# Patient Record
Sex: FEMALE | Race: WHITE | NOT HISPANIC OR LATINO | Employment: OTHER | URBAN - METROPOLITAN AREA
[De-identification: names, ages, dates, MRNs, and addresses within clinical notes are randomized per-mention and may not be internally consistent; named-entity substitution may affect disease eponyms.]

---

## 2021-06-09 ENCOUNTER — OFFICE VISIT (OUTPATIENT)
Dept: URGENT CARE | Facility: CLINIC | Age: 41
End: 2021-06-09
Payer: COMMERCIAL

## 2021-06-09 VITALS
SYSTOLIC BLOOD PRESSURE: 88 MMHG | HEIGHT: 65 IN | BODY MASS INDEX: 18.49 KG/M2 | HEART RATE: 58 BPM | RESPIRATION RATE: 16 BRPM | TEMPERATURE: 97.7 F | OXYGEN SATURATION: 100 % | DIASTOLIC BLOOD PRESSURE: 60 MMHG | WEIGHT: 111 LBS

## 2021-06-09 DIAGNOSIS — R30.0 DYSURIA: ICD-10-CM

## 2021-06-09 DIAGNOSIS — N30.01 ACUTE CYSTITIS WITH HEMATURIA: Primary | ICD-10-CM

## 2021-06-09 LAB
SL AMB  POCT GLUCOSE, UA: NORMAL
SL AMB LEUKOCYTE ESTERASE,UA: NORMAL
SL AMB POCT BILIRUBIN,UA: NORMAL
SL AMB POCT BLOOD,UA: NORMAL
SL AMB POCT CLARITY,UA: CLEAR
SL AMB POCT COLOR,UA: YELLOW
SL AMB POCT KETONES,UA: NORMAL
SL AMB POCT NITRITE,UA: NORMAL
SL AMB POCT PH,UA: 7.5
SL AMB POCT SPECIFIC GRAVITY,UA: 1
SL AMB POCT URINE HCG: NEGATIVE
SL AMB POCT URINE PROTEIN: NORMAL
SL AMB POCT UROBILINOGEN: 0.2

## 2021-06-09 PROCEDURE — 81025 URINE PREGNANCY TEST: CPT | Performed by: NURSE PRACTITIONER

## 2021-06-09 PROCEDURE — 81002 URINALYSIS NONAUTO W/O SCOPE: CPT | Performed by: NURSE PRACTITIONER

## 2021-06-09 PROCEDURE — 87086 URINE CULTURE/COLONY COUNT: CPT | Performed by: NURSE PRACTITIONER

## 2021-06-09 PROCEDURE — G0382 LEV 3 HOSP TYPE B ED VISIT: HCPCS | Performed by: NURSE PRACTITIONER

## 2021-06-09 RX ORDER — NITROFURANTOIN 25; 75 MG/1; MG/1
100 CAPSULE ORAL 2 TIMES DAILY
Qty: 10 CAPSULE | Refills: 0 | Status: SHIPPED | OUTPATIENT
Start: 2021-06-09 | End: 2021-06-14

## 2021-06-09 NOTE — PROGRESS NOTES
[facility]        NAME: Faisal Mcmanus is a 37 y o  female  : 1980    MRN: [id number]  DATE: 2021  TIME: 2:35 PM    Assessment and Plan   Acute cystitis with hematuria [N30 01]  1  Acute cystitis with hematuria  nitrofurantoin (MACROBID) 100 mg capsule    Urine culture   2  Dysuria  POCT urine dip    POCT urine HCG         Patient Instructions     Patient Instructions     Take antibiotic as directed  Recommend drinking plenty of fluids including water and cranberry juice  Recommend avoiding caffeine or alcohol  Empty bladder frequently  If you develop any high fever, severe back pain, abdominal pain, nausea, vomiting or any concerning symptoms please return proceed ER  Recommend following up with PCP in 3-5 days    Urinary Tract Infection in Women   WHAT YOU NEED TO KNOW:   A urinary tract infection (UTI) is caused by bacteria that get inside your urinary tract  Most bacteria that enter your urinary tract come out when you urinate  If the bacteria stay in your urinary tract, you may get an infection  Your urinary tract includes your kidneys, ureters, bladder, and urethra  Urine is made in your kidneys, and it flows from the ureters to the bladder  Urine leaves the bladder through the urethra  A UTI is more common in your lower urinary tract, which includes your bladder and urethra  DISCHARGE INSTRUCTIONS:   Return to the emergency department if:   · You are urinating very little or not at all  · You have a high fever with shaking chills  · You have side or back pain that gets worse  Call your doctor if:   · You have a fever  · You do not feel better after 2 days of taking antibiotics  · You are vomiting  · You have questions or concerns about your condition or care  Medicines:   · Antibiotics  help fight a bacterial infection  If you have UTIs often (called recurrent UTIs), you may be given antibiotics to take regularly   You will be given directions for when and how to use antibiotics  The goal is to prevent UTIs but not cause antibiotic resistance by using antibiotics too often  · Medicines  may be given to decrease pain and burning when you urinate  They will also help decrease the feeling that you need to urinate often  These medicines will make your urine orange or red  · Take your medicine as directed  Contact your healthcare provider if you think your medicine is not helping or if you have side effects  Tell him or her if you are allergic to any medicine  Keep a list of the medicines, vitamins, and herbs you take  Include the amounts, and when and why you take them  Bring the list or the pill bottles to follow-up visits  Carry your medicine list with you in case of an emergency  Follow up with your healthcare provider as directed:  Write down your questions so you remember to ask them during your visits  Prevent another UTI:   · Empty your bladder often  Urinate and empty your bladder as soon as you feel the need  Do not hold your urine for long periods of time  · Wipe from front to back after you urinate or have a bowel movement  This will help prevent germs from getting into your urinary tract through your urethra  · Drink liquids as directed  Ask how much liquid to drink each day and which liquids are best for you  You may need to drink more liquids than usual to help flush out the bacteria  Do not drink alcohol, caffeine, or citrus juices  These can irritate your bladder and increase your symptoms  Your healthcare provider may recommend cranberry juice to help prevent a UTI  · Urinate after you have sex  This can help flush out bacteria passed during sex  · Do not douche or use feminine deodorants  These can change the chemical balance in your vagina  · Change sanitary pads or tampons often  This will help prevent germs from getting into your urinary tract  · Talk to your healthcare provider about your birth control method  You may need to change your method if it is increasing your risk for UTIs  · Wear cotton underwear and clothes that are loose  Tight pants and nylon underwear can trap moisture and cause bacteria to grow  · Vaginal estrogen may be recommended  This medicine helps prevent UTIs in women who have gone through menopause or are in kaylee-menopause  · Do pelvic muscle exercises often  Pelvic muscle exercises may help you start and stop urinating  Strong pelvic muscles may help you empty your bladder easier  Squeeze these muscles tightly for 5 seconds like you are trying to hold back urine  Then relax for 5 seconds  Gradually work up to squeezing for 10 seconds  Do 3 sets of 15 repetitions a day, or as directed  © Copyright [address] Information is for End User's use only and may not be sold, redistributed or otherwise used for commercial purposes  All illustrations and images included in CareNotes® are the copyrighted property of [employer]  or [employer]   The above information is an  only  It is not intended as medical advice for individual conditions or treatments  Talk to your doctor, nurse or pharmacist before following any medical regimen to see if it is safe and effective for you  Follow up with PCP in 3-5 days  Proceed to  ER if symptoms worsen  Chief Complaint     Chief Complaint   Patient presents with    Possible UTI     pt states 2 weeks ago she had urinary burning then it went away  A few days ago she noticted cloudy urine, lower back pain, and vaginal itching and irritation  Thought she had yeast infection and applied OTC yeast infection cream and states made it burn  History of Present Illness       Patient is a 43-year-old female who presents with a 2 week history of dysuria and frequency  Patient states that a few days ago she notes her urine was cloudy  Has had intermittent vaginal itching and irritation    Denies any vaginal discharge  Last menstrual period was 1 week ago  Denies any recent UTIs  Denies any flank pain or hematuria  Denies any fever, chills, body aches  Denies any abdominal pain, nausea, or vomiting  Review of Systems   Review of Systems   Constitutional: Negative for chills, diaphoresis, fatigue and fever  Respiratory: Negative for cough, chest tightness and shortness of breath  Cardiovascular: Negative for chest pain  Gastrointestinal: Negative for abdominal pain, diarrhea, nausea and vomiting  Genitourinary: Positive for dysuria, frequency and urgency  Negative for decreased urine volume, difficulty urinating, flank pain, hematuria, pelvic pain, vaginal bleeding, vaginal discharge and vaginal pain  Musculoskeletal: Negative for back pain, joint swelling, myalgias, neck pain and neck stiffness  Skin: Negative for rash  Neurological: Negative for dizziness, weakness, numbness and headaches  Current Medications       Current Outpatient Medications:     nitrofurantoin (MACROBID) 100 mg capsule, Take 1 capsule (100 mg total) by mouth 2 (two) times a day for 5 days, Disp: 10 capsule, Rfl: 0    Current Allergies     Allergies as of 06/09/2021    (No Known Allergies)            The following portions of the patient's history were reviewed and updated as appropriate: allergies, current medications, past family history, past medical history, past social history, past surgical history and problem list      History reviewed  No pertinent past medical history  History reviewed  No pertinent surgical history  History reviewed  No pertinent family history  Medications have been verified  Objective   BP (!) 88/60 Comment: pt states she normally has low bp  Pulse 58   Temp 97 7 °F (36 5 °C) (Temporal)   Resp 16   Ht 5' 5" (1 651 m)   Wt 50 3 kg (111 lb)   LMP  (Within Days)   SpO2 100%   BMI 18 47 kg/m²   No LMP recorded (within days)         Physical Exam     Physical Exam  Constitutional:       General: She is not in acute distress  Appearance: Normal appearance  She is well-developed  She is not diaphoretic  Cardiovascular:      Rate and Rhythm: Normal rate and regular rhythm  Heart sounds: Normal heart sounds, S1 normal and S2 normal    Pulmonary:      Effort: Pulmonary effort is normal       Breath sounds: Normal breath sounds and air entry  Abdominal:      General: Bowel sounds are normal  There is no distension  Palpations: Abdomen is soft  Abdomen is not rigid  There is no mass  Tenderness: There is no abdominal tenderness  There is no right CVA tenderness, left CVA tenderness, guarding or rebound  Negative signs include Moses's sign and McBurney's sign  Skin:     General: Skin is warm and dry  Neurological:      Mental Status: She is alert and oriented to person, place, and time

## 2021-06-09 NOTE — PATIENT INSTRUCTIONS
Take antibiotic as directed  Recommend drinking plenty of fluids including water and cranberry juice  Recommend avoiding caffeine or alcohol  Empty bladder frequently  If you develop any high fever, severe back pain, abdominal pain, nausea, vomiting or any concerning symptoms please return proceed ER  Recommend following up with PCP in 3-5 days    Urinary Tract Infection in Women   WHAT YOU NEED TO KNOW:   A urinary tract infection (UTI) is caused by bacteria that get inside your urinary tract  Most bacteria that enter your urinary tract come out when you urinate  If the bacteria stay in your urinary tract, you may get an infection  Your urinary tract includes your kidneys, ureters, bladder, and urethra  Urine is made in your kidneys, and it flows from the ureters to the bladder  Urine leaves the bladder through the urethra  A UTI is more common in your lower urinary tract, which includes your bladder and urethra  DISCHARGE INSTRUCTIONS:   Return to the emergency department if:   · You are urinating very little or not at all  · You have a high fever with shaking chills  · You have side or back pain that gets worse  Call your doctor if:   · You have a fever  · You do not feel better after 2 days of taking antibiotics  · You are vomiting  · You have questions or concerns about your condition or care  Medicines:   · Antibiotics  help fight a bacterial infection  If you have UTIs often (called recurrent UTIs), you may be given antibiotics to take regularly  You will be given directions for when and how to use antibiotics  The goal is to prevent UTIs but not cause antibiotic resistance by using antibiotics too often  · Medicines  may be given to decrease pain and burning when you urinate  They will also help decrease the feeling that you need to urinate often  These medicines will make your urine orange or red  · Take your medicine as directed    Contact your healthcare provider if you think your medicine is not helping or if you have side effects  Tell him or her if you are allergic to any medicine  Keep a list of the medicines, vitamins, and herbs you take  Include the amounts, and when and why you take them  Bring the list or the pill bottles to follow-up visits  Carry your medicine list with you in case of an emergency  Follow up with your healthcare provider as directed:  Write down your questions so you remember to ask them during your visits  Prevent another UTI:   · Empty your bladder often  Urinate and empty your bladder as soon as you feel the need  Do not hold your urine for long periods of time  · Wipe from front to back after you urinate or have a bowel movement  This will help prevent germs from getting into your urinary tract through your urethra  · Drink liquids as directed  Ask how much liquid to drink each day and which liquids are best for you  You may need to drink more liquids than usual to help flush out the bacteria  Do not drink alcohol, caffeine, or citrus juices  These can irritate your bladder and increase your symptoms  Your healthcare provider may recommend cranberry juice to help prevent a UTI  · Urinate after you have sex  This can help flush out bacteria passed during sex  · Do not douche or use feminine deodorants  These can change the chemical balance in your vagina  · Change sanitary pads or tampons often  This will help prevent germs from getting into your urinary tract  · Talk to your healthcare provider about your birth control method  You may need to change your method if it is increasing your risk for UTIs  · Wear cotton underwear and clothes that are loose  Tight pants and nylon underwear can trap moisture and cause bacteria to grow  · Vaginal estrogen may be recommended  This medicine helps prevent UTIs in women who have gone through menopause or are in kaylee-menopause  · Do pelvic muscle exercises often  Pelvic muscle exercises may help you start and stop urinating  Strong pelvic muscles may help you empty your bladder easier  Squeeze these muscles tightly for 5 seconds like you are trying to hold back urine  Then relax for 5 seconds  Gradually work up to squeezing for 10 seconds  Do 3 sets of 15 repetitions a day, or as directed  © Copyright [address] Information is for End User's use only and may not be sold, redistributed or otherwise used for commercial purposes  All illustrations and images included in CareNotes® are the copyrighted property of [employer]  or [facility]   The above information is an  only  It is not intended as medical advice for individual conditions or treatments  Talk to your doctor, nurse or pharmacist before following any medical regimen to see if it is safe and effective for you

## 2021-06-10 LAB — BACTERIA UR CULT: NORMAL

## 2021-06-15 ENCOUNTER — OFFICE VISIT (OUTPATIENT)
Dept: URGENT CARE | Facility: CLINIC | Age: 41
End: 2021-06-15
Payer: COMMERCIAL

## 2021-06-15 VITALS
RESPIRATION RATE: 18 BRPM | DIASTOLIC BLOOD PRESSURE: 66 MMHG | WEIGHT: 111 LBS | BODY MASS INDEX: 17.84 KG/M2 | HEART RATE: 68 BPM | SYSTOLIC BLOOD PRESSURE: 107 MMHG | OXYGEN SATURATION: 100 % | HEIGHT: 66 IN

## 2021-06-15 DIAGNOSIS — B37.3 VAGINAL CANDIDIASIS: Primary | ICD-10-CM

## 2021-06-15 PROCEDURE — 99213 OFFICE O/P EST LOW 20 MIN: CPT | Performed by: PHYSICIAN ASSISTANT

## 2021-06-15 RX ORDER — FLUCONAZOLE 150 MG/1
150 TABLET ORAL
Qty: 3 TABLET | Refills: 0 | Status: SHIPPED | OUTPATIENT
Start: 2021-06-15 | End: 2021-06-22

## 2021-06-15 NOTE — PROGRESS NOTES
[facility]        NAME: Roque Lopez is a 37 y o  female  : 1980    MRN: [id number]  DATE: Dunia 15, 2021  TIME: 12:20 PM    Assessment and Plan   Vaginal candidiasis [B37 3]  1  Vaginal candidiasis  fluconazole (DIFLUCAN) 150 mg tablet      patient going  away to [facility] for few weeks and would like 1 dose of Diflucan in case it does not resolve with dose    Patient Instructions   Patient Instructions   Yeast Infection   WHAT YOU NEED TO KNOW:   A yeast infection, or vulvovaginal candidiasis, is a common vaginal infection  Vulvovaginal candidiasis is caused by a fungus, or yeast-like germ  Fungi are normally found in your vagina  Too many fungi can cause an infection  DISCHARGE INSTRUCTIONS:   Contact your healthcare provider if:   · You have fever and chills  · You develop abdominal or pelvic pain  · Your discharge is bloody and it is not your monthly period  · Your signs and symptoms get worse, even after treatment  · You have questions or concerns about your condition or care  Medicines:   · Medicines  help treat the fungal infection and decrease inflammation  The medicine may be a pill, cream, ointment, or vaginal tablet or suppository  · Take your medicine as directed  Contact your healthcare provider if you think your medicine is not helping or if you have side effects  Tell him of her if you are allergic to any medicine  Keep a list of the medicines, vitamins, and herbs you take  Include the amounts, and when and why you take them  Bring the list or the pill bottles to follow-up visits  Carry your medicine list with you in case of an emergency  Keep your vagina healthy:   · Do not have sex until your symptoms go away  Have your partner wear a condom until you complete your course of medication  · Always wipe from front to back  after you use the toilet  This prevents spreading bacteria from your rectal area into your vagina       · Clean around your vulva with mild soap and warm water each day  Gently dry the area after washing  Do not use hot tubs  The heat and moisture from hot tubs can increase your risk for another yeast infection  · Do not wear tight-fitting clothes or undergarments  for long periods  Wear cotton underwear during the day  Cotton helps keep your genital area dry and does not hold in warmth or moisture  Do not wear underwear at night  · Change your laundry soap or fabric softener  if you think it is irritating your skin  · Do not douche  or use feminine hygiene sprays or bubble bath  Do not use pads or tampons that are scented, or colored or perfumed toilet paper  · Ask your healthcare provider about birth control options if necessary  Condoms have latex and diaphragms have gel that kills sperm  Both of these may irritate your genital area  Follow up with your healthcare provider as directed:  Write down your questions so you remember to ask them during your visits  © Copyright [address] Information is for End User's use only and may not be sold, redistributed or otherwise used for commercial purposes  All illustrations and images included in CareNotes® are the copyrighted property of [employer] , Inc  or [address] [place] [address]  The above information is an  only  It is not intended as medical advice for individual conditions or treatments  Talk to your doctor, nurse or pharmacist before following any medical regimen to see if it is safe and effective for you  Follow up with PCP in 3-5 days  Proceed to  ER if symptoms worsen  Chief Complaint     Chief Complaint   Patient presents with    Vaginal Itching     vaginal itching and discharge, started a few weeks ago          History of Present Illness       Symptoms of a yeast infection that started before period about 2 weeks ago  Continued during and after mensces  Took a 5 days antibiotic that ended on Sunday  Vaginal Itchiness and discharge  Sensitivity with sexual intervourse  Denies dysuria, urinary frequency, fevers, vomiting, concern for STDs  Also has chronic inflammation of the gut  Noticed improvement of the GI symptoms with restricting sugars from diet  Has seen gastroenterology before and was scoped but never diagnosed with anything  Symptoms have been improving with dietary changes  Left lower abdominal pains  Review of Systems   Review of Systems   Constitutional: Negative for fatigue and fever  Gastrointestinal: Positive for abdominal pain  Negative for blood in stool, constipation, diarrhea, nausea and vomiting  Genitourinary: Positive for vaginal discharge  Negative for dysuria, flank pain and frequency  Neurological: Negative for weakness  Psychiatric/Behavioral: Negative for confusion  Current Medications       Current Outpatient Medications:     fluconazole (DIFLUCAN) 150 mg tablet, Take 1 tablet (150 mg total) by mouth every 3 (three) days for 3 doses, Disp: 3 tablet, Rfl: 0    Current Allergies     Allergies as of 06/15/2021    (No Known Allergies)            The following portions of the patient's history were reviewed and updated as appropriate: allergies, current medications, past family history, past medical history, past social history, past surgical history and problem list      History reviewed  No pertinent past medical history  History reviewed  No pertinent surgical history  History reviewed  No pertinent family history  Medications have been verified  Objective   /66   Pulse 68   Resp 18   Ht 5' 6" (1 676 m)   Wt 50 3 kg (111 lb)   LMP 06/05/2021 (Approximate)   SpO2 100%   BMI 17 92 kg/m²        Physical Exam     Physical Exam  Constitutional:       Appearance: Normal appearance  Abdominal:      General: Abdomen is flat  Bowel sounds are normal       Palpations: Abdomen is soft  Tenderness: There is no abdominal tenderness   There is no right CVA tenderness, left CVA tenderness, guarding or rebound  Neurological:      Mental Status: She is alert     Psychiatric:         Mood and Affect: Mood normal          Behavior: Behavior normal

## 2021-06-15 NOTE — PATIENT INSTRUCTIONS
Yeast Infection   WHAT YOU NEED TO KNOW:   A yeast infection, or vulvovaginal candidiasis, is a common vaginal infection  Vulvovaginal candidiasis is caused by a fungus, or yeast-like germ  Fungi are normally found in your vagina  Too many fungi can cause an infection  DISCHARGE INSTRUCTIONS:   Contact your healthcare provider if:   · You have fever and chills  · You develop abdominal or pelvic pain  · Your discharge is bloody and it is not your monthly period  · Your signs and symptoms get worse, even after treatment  · You have questions or concerns about your condition or care  Medicines:   · Medicines  help treat the fungal infection and decrease inflammation  The medicine may be a pill, cream, ointment, or vaginal tablet or suppository  · Take your medicine as directed  Contact your healthcare provider if you think your medicine is not helping or if you have side effects  Tell him of her if you are allergic to any medicine  Keep a list of the medicines, vitamins, and herbs you take  Include the amounts, and when and why you take them  Bring the list or the pill bottles to follow-up visits  Carry your medicine list with you in case of an emergency  Keep your vagina healthy:   · Do not have sex until your symptoms go away  Have your partner wear a condom until you complete your course of medication  · Always wipe from front to back  after you use the toilet  This prevents spreading bacteria from your rectal area into your vagina  · Clean around your vulva with mild soap and warm water each day  Gently dry the area after washing  Do not use hot tubs  The heat and moisture from hot tubs can increase your risk for another yeast infection  · Do not wear tight-fitting clothes or undergarments  for long periods  Wear cotton underwear during the day  Cotton helps keep your genital area dry and does not hold in warmth or moisture  Do not wear underwear at night      · Change your laundry soap or fabric softener  if you think it is irritating your skin  · Do not douche  or use feminine hygiene sprays or bubble bath  Do not use pads or tampons that are scented, or colored or perfumed toilet paper  · Ask your healthcare provider about birth control options if necessary  Condoms have latex and diaphragms have gel that kills sperm  Both of these may irritate your genital area  Follow up with your healthcare provider as directed:  Write down your questions so you remember to ask them during your visits  © Copyright [address] Information is for End User's use only and may not be sold, redistributed or otherwise used for commercial purposes  All illustrations and images included in CareNotes® are the copyrighted property of A [employer] A M [employer] Inc  or [employer]  The above information is an  only  It is not intended as medical advice for individual conditions or treatments  Talk to your doctor, nurse or pharmacist before following any medical regimen to see if it is safe and effective for you

## 2021-07-11 ENCOUNTER — HOSPITAL ENCOUNTER (EMERGENCY)
Facility: HOSPITAL | Age: 41
Discharge: HOME | End: 2021-07-11
Attending: EMERGENCY MEDICINE | Admitting: EMERGENCY MEDICINE
Payer: COMMERCIAL

## 2021-07-11 ENCOUNTER — APPOINTMENT (EMERGENCY)
Dept: CT IMAGING | Facility: HOSPITAL | Age: 41
End: 2021-07-11

## 2021-07-11 VITALS
SYSTOLIC BLOOD PRESSURE: 105 MMHG | HEART RATE: 61 BPM | TEMPERATURE: 97.8 F | WEIGHT: 110 LBS | BODY MASS INDEX: 18.33 KG/M2 | DIASTOLIC BLOOD PRESSURE: 64 MMHG | RESPIRATION RATE: 17 BRPM | HEIGHT: 65 IN | OXYGEN SATURATION: 98 %

## 2021-07-11 DIAGNOSIS — R10.9 FLANK PAIN: Primary | ICD-10-CM

## 2021-07-11 LAB
ALBUMIN SERPL BCP-MCNC: 3.6 G/DL (ref 3.5–5)
ALP SERPL-CCNC: 47 U/L (ref 46–116)
ALT SERPL W P-5'-P-CCNC: 18 U/L (ref 12–78)
ANION GAP SERPL CALCULATED.3IONS-SCNC: 8 MMOL/L (ref 4–13)
AST SERPL W P-5'-P-CCNC: 13 U/L (ref 5–45)
BASOPHILS # BLD AUTO: 0.05 THOUSANDS/ΜL (ref 0–0.1)
BASOPHILS NFR BLD AUTO: 1 % (ref 0–1)
BILIRUB SERPL-MCNC: 0.5 MG/DL (ref 0.2–1)
BILIRUB UR QL STRIP: NEGATIVE
BUN SERPL-MCNC: 7 MG/DL (ref 5–25)
CALCIUM SERPL-MCNC: 8.6 MG/DL (ref 8.3–10.1)
CHLORIDE SERPL-SCNC: 101 MMOL/L (ref 100–108)
CLARITY UR: CLEAR
CO2 SERPL-SCNC: 27 MMOL/L (ref 21–32)
COLOR UR: ABNORMAL
CREAT SERPL-MCNC: 0.69 MG/DL (ref 0.6–1.3)
EOSINOPHIL # BLD AUTO: 0.12 THOUSAND/ΜL (ref 0–0.61)
EOSINOPHIL NFR BLD AUTO: 3 % (ref 0–6)
ERYTHROCYTE [DISTWIDTH] IN BLOOD BY AUTOMATED COUNT: 12.1 % (ref 11.6–15.1)
EXT PREG TEST URINE: NEGATIVE
EXT. CONTROL ED NAV: NORMAL
GFR SERPL CREATININE-BSD FRML MDRD: 109 ML/MIN/1.73SQ M
GLUCOSE SERPL-MCNC: 81 MG/DL (ref 65–140)
GLUCOSE UR STRIP-MCNC: NEGATIVE MG/DL
HCT VFR BLD AUTO: 36.7 % (ref 34.8–46.1)
HGB BLD-MCNC: 12.7 G/DL (ref 11.5–15.4)
HGB UR QL STRIP.AUTO: NEGATIVE
IMM GRANULOCYTES # BLD AUTO: 0.01 THOUSAND/UL (ref 0–0.2)
IMM GRANULOCYTES NFR BLD AUTO: 0 % (ref 0–2)
KETONES UR STRIP-MCNC: ABNORMAL MG/DL
LEUKOCYTE ESTERASE UR QL STRIP: NEGATIVE
LIPASE SERPL-CCNC: 49 U/L (ref 73–393)
LYMPHOCYTES # BLD AUTO: 1.04 THOUSANDS/ΜL (ref 0.6–4.47)
LYMPHOCYTES NFR BLD AUTO: 30 % (ref 14–44)
MCH RBC QN AUTO: 32.7 PG (ref 26.8–34.3)
MCHC RBC AUTO-ENTMCNC: 34.6 G/DL (ref 31.4–37.4)
MCV RBC AUTO: 95 FL (ref 82–98)
MONOCYTES # BLD AUTO: 0.37 THOUSAND/ΜL (ref 0.17–1.22)
MONOCYTES NFR BLD AUTO: 11 % (ref 4–12)
NEUTROPHILS # BLD AUTO: 1.89 THOUSANDS/ΜL (ref 1.85–7.62)
NEUTS SEG NFR BLD AUTO: 55 % (ref 43–75)
NITRITE UR QL STRIP: NEGATIVE
NRBC BLD AUTO-RTO: 0 /100 WBCS
PH UR STRIP.AUTO: 7 [PH]
PLATELET # BLD AUTO: 261 THOUSANDS/UL (ref 149–390)
PMV BLD AUTO: 10 FL (ref 8.9–12.7)
POTASSIUM SERPL-SCNC: 3.8 MMOL/L (ref 3.5–5.3)
PROT SERPL-MCNC: 6.2 G/DL (ref 6.4–8.2)
PROT UR STRIP-MCNC: NEGATIVE MG/DL
RBC # BLD AUTO: 3.88 MILLION/UL (ref 3.81–5.12)
SODIUM SERPL-SCNC: 136 MMOL/L (ref 136–145)
SP GR UR STRIP.AUTO: <=1.005 (ref 1–1.03)
UROBILINOGEN UR QL STRIP.AUTO: 0.2 E.U./DL
WBC # BLD AUTO: 3.48 THOUSAND/UL (ref 4.31–10.16)

## 2021-07-11 PROCEDURE — 74176 CT ABD & PELVIS W/O CONTRAST: CPT

## 2021-07-11 PROCEDURE — 80053 COMPREHEN METABOLIC PANEL: CPT | Performed by: EMERGENCY MEDICINE

## 2021-07-11 PROCEDURE — 99284 EMERGENCY DEPT VISIT MOD MDM: CPT | Performed by: EMERGENCY MEDICINE

## 2021-07-11 PROCEDURE — 83690 ASSAY OF LIPASE: CPT | Performed by: EMERGENCY MEDICINE

## 2021-07-11 PROCEDURE — 81025 URINE PREGNANCY TEST: CPT | Performed by: EMERGENCY MEDICINE

## 2021-07-11 PROCEDURE — 81003 URINALYSIS AUTO W/O SCOPE: CPT | Performed by: EMERGENCY MEDICINE

## 2021-07-11 PROCEDURE — 36415 COLL VENOUS BLD VENIPUNCTURE: CPT | Performed by: EMERGENCY MEDICINE

## 2021-07-11 PROCEDURE — 99284 EMERGENCY DEPT VISIT MOD MDM: CPT

## 2021-07-11 PROCEDURE — 85025 COMPLETE CBC W/AUTO DIFF WBC: CPT | Performed by: EMERGENCY MEDICINE

## 2021-07-11 NOTE — DISCHARGE INSTRUCTIONS
Please follow up with primary care for recheck as needed, if vomiting, fevers, worsening pain return to ED

## 2021-07-11 NOTE — ED PROVIDER NOTES
History  Chief Complaint   Patient presents with    Flank Pain     pt c/o left sided flank pain and burning upon urination  HPI  21-year-old female presents with left flank pain for the past 2 days with dysuria  She states that she has had kidney stones in the past   Denies any hematuria  She felt like her symptoms started after she took fluconazole for yeast infection  Pain is aching and constant, mild to moderate  No fevers or chills  None       No past medical history on file  No past surgical history on file  No family history on file  I have reviewed and agree with the history as documented  E-Cigarette/Vaping     E-Cigarette/Vaping Substances     Social History     Tobacco Use    Smoking status: Never Smoker    Smokeless tobacco: Never Used   Substance Use Topics    Alcohol use: Never    Drug use: Never       Review of Systems   Constitutional: Negative for chills and fever  HENT: Negative for dental problem and ear pain  Eyes: Negative for pain and redness  Respiratory: Negative for cough and shortness of breath  Cardiovascular: Negative for chest pain and palpitations  Gastrointestinal: Negative for abdominal pain and nausea  Endocrine: Negative for polydipsia and polyphagia  Genitourinary: Positive for dysuria and flank pain  Negative for frequency  Musculoskeletal: Negative for arthralgias and joint swelling  Skin: Negative for color change and rash  Neurological: Negative for dizziness and headaches  Psychiatric/Behavioral: Negative for behavioral problems and confusion  All other systems reviewed and are negative  Physical Exam  Physical Exam  Vitals and nursing note reviewed  Constitutional:       General: She is not in acute distress  Appearance: She is well-developed  She is not diaphoretic  HENT:      Head: Atraumatic        Right Ear: External ear normal       Left Ear: External ear normal       Nose: Nose normal    Eyes: Conjunctiva/sclera: Conjunctivae normal       Pupils: Pupils are equal, round, and reactive to light  Neck:      Vascular: No JVD  Cardiovascular:      Rate and Rhythm: Normal rate and regular rhythm  Heart sounds: Normal heart sounds  No murmur heard  Pulmonary:      Effort: Pulmonary effort is normal  No respiratory distress  Breath sounds: Normal breath sounds  No wheezing  Abdominal:      General: Bowel sounds are normal  There is no distension  Palpations: Abdomen is soft  Tenderness: There is no abdominal tenderness  Musculoskeletal:         General: Normal range of motion  Cervical back: Normal range of motion and neck supple  Comments: + L CVA tenderness   Skin:     General: Skin is warm and dry  Capillary Refill: Capillary refill takes less than 2 seconds  Neurological:      Mental Status: She is alert and oriented to person, place, and time  Cranial Nerves: No cranial nerve deficit     Psychiatric:         Behavior: Behavior normal          Vital Signs  ED Triage Vitals [07/11/21 1149]   Temperature Pulse Respirations Blood Pressure SpO2   97 8 °F (36 6 °C) 55 18 100/66 100 %      Temp Source Heart Rate Source Patient Position - Orthostatic VS BP Location FiO2 (%)   Temporal Monitor Sitting Left arm --      Pain Score       --           Vitals:    07/11/21 1149   BP: 100/66   Pulse: 55   Patient Position - Orthostatic VS: Sitting         Visual Acuity      ED Medications  Medications - No data to display    Diagnostic Studies  Results Reviewed     Procedure Component Value Units Date/Time    Comprehensive metabolic panel [[id number]]  (Abnormal) Collected: 07/11/21 1220    Lab Status: Final result Specimen: Blood from Arm, Right Updated: 07/11/21 1240     Sodium 136 mmol/L      Potassium 3 8 mmol/L      Chloride 101 mmol/L      CO2 27 mmol/L      ANION GAP 8 mmol/L      BUN 7 mg/dL      Creatinine 0 69 mg/dL      Glucose 81 mg/dL      Calcium 8 6 mg/dL AST 13 U/L      ALT 18 U/L      Alkaline Phosphatase 47 U/L      Total Protein 6 2 g/dL      Albumin 3 6 g/dL      Total Bilirubin 0 50 mg/dL      eGFR 109 ml/min/1 73sq m     Narrative:      [place] guidelines for Chronic Kidney Disease (CKD):     Stage 1 with normal or high GFR (GFR > 90 mL/min/1 73 square meters)    Stage 2 Mild CKD (GFR = 60-89 mL/min/1 73 square meters)    Stage 3A Moderate CKD (GFR = 45-59 mL/min/1 73 square meters)    Stage 3B Moderate CKD (GFR = 30-44 mL/min/1 73 square meters)    Stage 4 Severe CKD (GFR = 15-29 mL/min/1 73 square meters)    Stage 5 End Stage CKD (GFR <15 mL/min/1 73 square meters)  Note: GFR calculation is accurate only with a steady state creatinine    Lipase [[id number]]  (Abnormal) Collected: 07/11/21 1220    Lab Status: Final result Specimen: Blood from Arm, Right Updated: 07/11/21 1240     Lipase 49 u/L     POCT pregnancy, urine [[id number]]  (Normal) Resulted: 07/11/21 1220    Lab Status: Final result Updated: 07/11/21 1230     EXT PREG TEST UR (Ref: Negative) negative     Control valid    UA w Reflex to Microscopic w Reflex to Culture [[id number]]  (Abnormal) Collected: 07/11/21 1219    Lab Status: Final result Specimen: Urine, Clean Catch Updated: 07/11/21 1228     Color, UA Light Yellow     Clarity, UA Clear     Specific Gravity, UA <=1 005     pH, UA 7 0     Leukocytes, UA Negative     Nitrite, UA Negative     Protein, UA Negative mg/dl      Glucose, UA Negative mg/dl      Ketones, UA Trace mg/dl      Urobilinogen, UA 0 2 E U /dl      Bilirubin, UA Negative     Blood, UA Negative    CBC and differential [[id number]]  (Abnormal) Collected: 07/11/21 1220    Lab Status: Final result Specimen: Blood from Arm, Right Updated: 07/11/21 1225     WBC 3 48 Thousand/uL      RBC 3 88 Million/uL      Hemoglobin 12 7 g/dL      Hematocrit 36 7 %      MCV 95 fL      MCH 32 7 pg      MCHC 34 6 g/dL      RDW 12 1 %      MPV 10 0 fL      Platelets 261 Thousands/uL      nRBC 0 /100 WBCs      Neutrophils Relative 55 %      Immat GRANS % 0 %      Lymphocytes Relative 30 %      Monocytes Relative 11 %      Eosinophils Relative 3 %      Basophils Relative 1 %      Neutrophils Absolute 1 89 Thousands/µL      Immature Grans Absolute 0 01 Thousand/uL      Lymphocytes Absolute 1 04 Thousands/µL      Monocytes Absolute 0 37 Thousand/µL      Eosinophils Absolute 0 12 Thousand/µL      Basophils Absolute 0 05 Thousands/µL                  CT renal stone study abdomen pelvis wo contrast   Final Result by Jazmin Giron MD (07/11 1311)      1  No obstructive uropathy  2   Trace pelvic ascites, likely physiologic  Otherwise, no acute abnormality the abdomen or pelvis  Workstation performed: [id number]                    Procedures  Procedures         ED Course                             SBIRT 20yo+      Most Recent Value   SBIRT (24 yo +)   In order to provide better care to our patients, we are screening all of our patients for alcohol and drug use  Would it be okay to ask you these screening questions? No Filed at: 07/11/2021 1153                    MDM  38 yo F presents with flank pain and dysuria  UA negative and CT shows no sign of stone or other acute abnormality  Labs unremarkable and reassuring  She appears well declines any pain medication  Will give information for PCP follow up  Disposition  Final diagnoses:   Flank pain     Time reflects when diagnosis was documented in both MDM as applicable and the Disposition within this note     Time User Action Codes Description Comment    7/11/2021  1:20 PM Giorgi Carter Add [R10 9] Flank pain       ED Disposition     ED Disposition Condition Date/Time Comment    Discharge Stable Sun Jul 11, 2021  1:20 PM Henry Healy discharge to home/self care              Follow-up Information     Follow up With Specialties Details Why Contact [facility], Nurse Practitioner Schedule an appointment as soon as possible for a visit   Lefty Dean  [phone]            Patient's Medications    No medications on file     No discharge procedures on file      PDMP Review     None          ED Provider  Electronically Signed by           Wesley Tovar MD  07/11/21 6561

## 2021-07-26 ENCOUNTER — OFFICE VISIT (OUTPATIENT)
Dept: PHYSICAL THERAPY | Facility: CLINIC | Age: 41
End: 2021-07-26
Payer: COMMERCIAL

## 2021-07-26 VITALS — SYSTOLIC BLOOD PRESSURE: 90 MMHG | DIASTOLIC BLOOD PRESSURE: 70 MMHG

## 2021-07-26 DIAGNOSIS — M25.552 LEFT HIP PAIN: Primary | ICD-10-CM

## 2021-07-26 DIAGNOSIS — S76.912A STRAIN OF LEFT ILIOPSOAS MUSCLE, INITIAL ENCOUNTER: ICD-10-CM

## 2021-07-26 PROCEDURE — 97162 PT EVAL MOD COMPLEX 30 MIN: CPT | Performed by: PHYSICAL THERAPIST

## 2021-07-26 PROCEDURE — 97140 MANUAL THERAPY 1/> REGIONS: CPT | Performed by: PHYSICAL THERAPIST

## 2021-07-26 NOTE — PROGRESS NOTES
PT Evaluation     Today's date: 2021  Patient name: Lorraine Worthy  : 1980  MRN: [id number]  Referring provider: Analisa Ivory, *  Dx:   Encounter Diagnosis     ICD-10-CM    1  Left hip pain  M25 552    2  Strain of left iliopsoas muscle, initial encounter  [id number]                   Assessment  Assessment details: Lorraine Worthy is a [address] o  female who presents with complaints of acute L hip and lower back pain  No further referral appears necessary at this time based upon examination results  Clinical exam is consistent with iliopsoas strain and quadratus lumborum strain  Patient presents with some LBP and demonstrates a directional preference of lumbar extension  Patient should respond well to stretching and a lumbopelvic stabilization program with extension bias  Prognosis is good given HEP compliance and PT 2x/wk  Impairments: abnormal or restricted ROM, abnormal movement, activity intolerance, impaired balance, impaired physical strength, lacks appropriate home exercise program and pain with function  Functional limitations: limited participation in yoga, limited tolerance to walking and stair climbing, limited participation in other recreation (bike riding, kayaking)Understanding of Dx/Px/POC: good   Prognosis: good    Goals  STGs  1) In 4 weeks patient will report 50% reduction in pain  2) In 4 weeks patient will demonstrate full lumbar ROM in all directions pain free  3) In 4 weeks patient will demonstrate independence with HEP    LTGs  1) In 6-8 weeks patient will demonstrate ability to participate in recreation (bike riding, kayaking)  2) in 6-8 weeks patient will demonstrate ability to participate fully in work activity () without modifications    Plan  Plan details: Patient is direct access   Will need MD script/signature on POC after 21  Patient would benefit from: skilled physical therapy  Planned modality interventions: TENS, thermotherapy: hydrocollator packs, traction, ultrasound, unattended electrical stimulation and cryotherapy  Planned therapy interventions: abdominal trunk stabilization, balance, balance/weight bearing training, massage, manual therapy, muscle pump exercises, joint mobilization, patient education, neuromuscular re-education, postural training, self care, strengthening, stretching, therapeutic activities, therapeutic exercise, flexibility, functional ROM exercises and home exercise program  Frequency: 2x week  Duration in weeks: 4  Plan of Care beginning date: 2021  Plan of Care expiration date: 2021  Treatment plan discussed with: patient        Subjective Evaluation    History of Present Illness  Mechanism of injury: Patient presents to PT with c/o L psoas pain  She reports she was mountain biking about 9 weeks ago and just pushed it too far  The pain worsened over time  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, 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  She was using heat and ice on and off, but more recently has been using only heat and this seems to be working better  She tried a muscle relaxer for 3 nights which did help  She will take Advil every once in a while but tries not to take medication  She reports the pain starts in the front of her L hip that wraps up around her L pelvic bone and slightly into her L lower back up toward bottom ribs  Denies any buttock pain  She has had massages that have seemed to help relieve pain  Denies N/T into LE  She had a recent h/o UTI, was checked for kidney stones but no evidence of stones    Pain  Current pain ratin  At best pain ratin  At worst pain ratin  Location: L hip   Quality: tight  Relieving factors: heat    Social Support    Employment status: working (, )    Diagnostic Tests  No diagnostic tests performed  Treatments  Previous treatment: massage  Patient Goals  Patient goals for therapy: decreased pain  Patient goal: learn a diagnosis        Objective     Concurrent Complaints  Negative for night pain, disturbed sleep, bladder dysfunction, bowel dysfunction and saddle (S4) numbness    Palpation   Left   Hypertonic in the quadratus lumborum  Tenderness of the iliopsoas and quadratus lumborum  Tenderness     Lumbar Spine  No tenderness in the spinous process, facet joint, left transverse process and right transverse process  Left Hip   No tenderness in the ASIS, PSIS, greater trochanter, iliac crest and sacroiliac joint  Right Hip   No tenderness in the ASIS, PSIS, greater trochanter, iliac crest and sacroiliac joint  Neurological Testing     Sensation     Lumbar   Left   Intact: light touch    Right   Intact: light touch    Reflexes   Left   Patellar (L4): trace (1+)  Achilles (S1): normal (2+)  Babinski sign: negative  Clonus sign: negative    Right   Patellar (L4): trace (1+)  Achilles (S1): normal (2+)  Babinski sign: negative  Clonus sign: negative    Active Range of Motion     Lumbar   Flexion:  with pain Restriction level: moderate  Extension:  WFL    Passive Range of Motion   Left Hip   Flexion: WFL  Extension: WFL  Abduction: WFL  External rotation (90/90): [facility]  Internal rotation (90/90): WFL    Right Hip   Flexion: WFL  Extension: WFL  Abduction: WFL  External rotation (90/90): [facility]  Internal rotation (90/90):  [facility]    Joint Play     Hypomobile: L3, L4 and L5     Pain: T12, L1 and L2   Mechanical Assessment    Cervical      Thoracic      Lumbar    Standing flexion: repeated movements   Pain location:no change  Pain level: produced  Standing extension: repeated movements  Pain location: no change  Pain level: decreased  Lying extension: repeated movements  Pain location: no change  Pain intensity: better  Pain level: decreased    Strength/Myotome Testing     Left Hip   Planes of Motion   Flexion: 3+ (painful)  Extension: 4+    Right Hip   Planes of Motion   Flexion: 4+  Extension: 4+    Left Knee   Flexion: 4+  Extension: 3+ (painful)    Right Knee   Flexion: 4+  Extension: 4+    Left Ankle/Foot   Dorsiflexion: 5  Plantar flexion: 5  Great toe extension: 5    Right Ankle/Foot   Dorsiflexion: 5  Plantar flexion: 5  Great toe extension: 5    Tests     Lumbar     Left   Negative crossed SLR, passive SLR and slump test      Right   Negative crossed SLR, passive SLR and slump test      Left Pelvic Girdle/Sacrum   Negative: active SLR test      Right Pelvic Girdle/Sacrum   Negative: active SLR test      Left Hip   Negative Olivia Naidu: Positive  90/90 SLR: Negative  Right Hip   Negative Olivia Naidu: Negative  90/90 SLR: Negative               Precautions: none  DIRECT ACCESS ONLY - POC EXP 8/25/21  Manuals 7/26       IASTM to L quadratus lumborum KG       PA mobs to lumbar spine Central, gr II  KG                       Neuro Re-Ed        PPT        pball abdominal bracing        Bridges        Prone alt LE lifts        Prone alt UE lifts        Prone alt UE/LE lifts                        Ther Ex        Upright bike        ITB stretch supine w/strap 30"x3       Nathan pose lumbar flexion stretch        PPU Self x10  w/clinician OP x10       Hip flexor stretch - kneeling                                Ther Activity                                                Modalities        MHP prn

## 2021-07-29 ENCOUNTER — OFFICE VISIT (OUTPATIENT)
Dept: PHYSICAL THERAPY | Facility: CLINIC | Age: 41
End: 2021-07-29
Payer: COMMERCIAL

## 2021-07-29 DIAGNOSIS — S76.912A STRAIN OF LEFT ILIOPSOAS MUSCLE, INITIAL ENCOUNTER: ICD-10-CM

## 2021-07-29 DIAGNOSIS — M25.552 LEFT HIP PAIN: Primary | ICD-10-CM

## 2021-07-29 PROCEDURE — 97110 THERAPEUTIC EXERCISES: CPT | Performed by: PHYSICAL THERAPIST

## 2021-07-29 PROCEDURE — 97140 MANUAL THERAPY 1/> REGIONS: CPT | Performed by: PHYSICAL THERAPIST

## 2021-07-29 NOTE — PROGRESS NOTES
Daily Note     Today's date: 2021  Patient name: Henry Healy  : 1980  MRN: [id number]  Referring provider: Zoë Barksdale, *  Dx:   Encounter Diagnosis     ICD-10-CM    1  Left hip pain  M25 552    2  Strain of left iliopsoas muscle, initial encounter  [id number]                   Subjective: Patient reports she hasn't been able to move much since her evaluation the other day  She went for a 30 min walk this morning  There is a new sensation of a "pin" feeling in her L lower back depending on her position  She rates her current pain level 8/10  Objective: See treatment diary below  (-) supine to long sit test      Assessment: Tolerated treatment fair  Poor pelvic movement janie with L posterior glide and posterior rotation  No significant change in symptoms during session  Slight relief from LBP during PA mobs, however patient reported no consistent relief once manuals were completed  Poor isolation of glute activation when performing bilaterally  Patient demonstrates anterior pelvic tilt when performing simultaneous glute activation  Focused on isometric unilateral glute activation to isolate muscle  Patient demonstrated fatigue post treatment, exhibited good technique with therapeutic exercises and would benefit from continued PT      Plan: Continue per plan of care  Progress treatment as tolerated         Precautions: none  DIRECT ACCESS ONLY - POC EXP 21  Manuals       IASTM to L quadratus lumborum KG held      PA mobs to lumbar spine Central, gr II  KG Central, gr II  KG      Posterior pelvic glide  L only  KG      L hip posterior capsule mob  KG      Ther Ex        PPT  x20      pball abdominal bracing  x20      Bridges        Prone alt LE lifts  glute squeeze  x10 ea      Prone alt UE lifts        Prone alt UE/LE lifts        Upright bike  10 mins      ITB stretch supine w/strap 30"x3       Nathan pose lumbar flexion stretch        PPU Self x10  w/clinician OP x10 Hip flexor stretch - kneeling                                Ther Activity                                                Modalities        MHP prn

## 2021-08-02 ENCOUNTER — OFFICE VISIT (OUTPATIENT)
Dept: PHYSICAL THERAPY | Facility: CLINIC | Age: 41
End: 2021-08-02
Payer: COMMERCIAL

## 2021-08-02 DIAGNOSIS — M25.552 LEFT HIP PAIN: Primary | ICD-10-CM

## 2021-08-02 DIAGNOSIS — S76.912A STRAIN OF LEFT ILIOPSOAS MUSCLE, INITIAL ENCOUNTER: ICD-10-CM

## 2021-08-02 PROCEDURE — 97140 MANUAL THERAPY 1/> REGIONS: CPT | Performed by: PHYSICAL THERAPIST

## 2021-08-02 PROCEDURE — 97110 THERAPEUTIC EXERCISES: CPT | Performed by: PHYSICAL THERAPIST

## 2021-08-02 NOTE — PROGRESS NOTES
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 with much less pain  She is currently not experiencing pain around her iliac crest  She points to pain along her L lateral lumbar spine  Objective: See treatment diary below      Assessment: Tolerated treatment well  Progressed with prone DLS UE and LE raises as well as seated multifidus activation  Patient reported more noticeable pain/pulling with R rot, no sx with L rot  Patient demonstrated fatigue post treatment, exhibited good technique with therapeutic exercises and would benefit from continued PT      Plan: Continue per plan of care  Progress treatment as tolerated         Precautions: none  DIRECT ACCESS ONLY - POC EXP 21  Manuals      IASTM to L quadratus lumborum KG held      PA mobs to lumbar spine Central, gr II  KG Central, gr II  KG Central, gr II  KG     Posterior pelvic glide  L only  KG L only  KG     L hip posterior capsule mob  KG KG     Ther Ex        PPT  x20      pball abdominal bracing  x20      Richerd Bucy   W/black TB  5" 2x10     Prone alt LE lifts  glute squeeze  x10 ea x10 ea     Prone alt UE lifts        Prone alt UE/LE lifts   x10 ea     Upright bike  10 mins L3 x10 mins     ITB stretch supine w/strap 30"x3       Nathan pose lumbar flexion stretch        PPU Self x10  w/clinician OP x10       Hip flexor stretch - kneeling        Seated multifidus rotation with TB   Blue TB x10 ea     Walking lunges   15 feet, x2             Ther Activity Modalities        MHP prn

## 2021-08-05 ENCOUNTER — APPOINTMENT (OUTPATIENT)
Dept: PHYSICAL THERAPY | Facility: CLINIC | Age: 41
End: 2021-08-05
Payer: COMMERCIAL

## 2021-08-06 ENCOUNTER — OFFICE VISIT (OUTPATIENT)
Dept: PHYSICAL THERAPY | Facility: CLINIC | Age: 41
End: 2021-08-06
Payer: COMMERCIAL

## 2021-08-06 DIAGNOSIS — S76.912A STRAIN OF LEFT ILIOPSOAS MUSCLE, INITIAL ENCOUNTER: ICD-10-CM

## 2021-08-06 DIAGNOSIS — M25.552 LEFT HIP PAIN: Primary | ICD-10-CM

## 2021-08-06 PROCEDURE — 97140 MANUAL THERAPY 1/> REGIONS: CPT | Performed by: PHYSICAL THERAPIST

## 2021-08-06 NOTE — PROGRESS NOTES
Daily Note     Today's date: 2021  Patient name: Awilda Muir  : 1980  MRN: [id number]  Referring provider: Chacha Potter, *  Dx:   Encounter Diagnosis     ICD-10-CM    1  Left hip pain  M25 552    2  Strain of left iliopsoas muscle, initial encounter  [id number]                   Subjective: Patient reports she is definitely seeing improvement  She is wondering what else she can do to release the tightness  She cont to notice tightness along L side of lower back toward thoracic spine  She continues to participate in yoga class  Objective: See treatment diary below      Assessment: Tolerated treatment well  Focused treatment on manual treatment  Responds well to manuals  Patient would benefit from continued PT      Plan: Continue per plan of care        Precautions: none  DIRECT ACCESS ONLY - POC EXP 21  Manuals     IASTM to L quadratus lumborum KG held      PA mobs to lumbar spine Central, gr II  KG Central, gr II  KG Central, gr II  KG Central, gr II  KG    PA mobs to thoracic spine    Gr II  KG    Posterior pelvic glide  L only  KG L only  KG L only  KG    L hip posterior capsule mob  KG KG     Ther Ex        PPT  x20      pball abdominal bracing  x20      Bailey Fredo   W/black TB  5" 2x10 W/black TB  5" x20    Prone alt LE lifts  glute squeeze  x10 ea x10 ea     Prone alt UE lifts        Prone alt UE/LE lifts   x10 ea     Upright bike  10 mins L3 x10 mins L3 x10 mins    ITB stretch supine w/strap 30"x3       Nathan pose lumbar flexion stretch        PPU Self x10  w/clinician OP x10   x10    Hip flexor stretch - kneeling        Seated multifidus rotation with TB   Blue TB x10 ea     Walking lunges   15 feet, x2             Ther Activity                                                Modalities        MHP prn

## 2021-08-15 ENCOUNTER — OFFICE VISIT (OUTPATIENT)
Dept: URGENT CARE | Facility: CLINIC | Age: 41
End: 2021-08-15
Payer: COMMERCIAL

## 2021-08-15 VITALS
HEART RATE: 60 BPM | RESPIRATION RATE: 18 BRPM | WEIGHT: 110 LBS | TEMPERATURE: 97.6 F | BODY MASS INDEX: 18.33 KG/M2 | OXYGEN SATURATION: 98 % | HEIGHT: 65 IN

## 2021-08-15 DIAGNOSIS — H60.502 ACUTE OTITIS EXTERNA OF LEFT EAR, UNSPECIFIED TYPE: ICD-10-CM

## 2021-08-15 DIAGNOSIS — H61.22 HEARING LOSS OF LEFT EAR DUE TO CERUMEN IMPACTION: Primary | ICD-10-CM

## 2021-08-15 PROCEDURE — 99213 OFFICE O/P EST LOW 20 MIN: CPT | Performed by: PHYSICIAN ASSISTANT

## 2021-08-15 PROCEDURE — 69209 REMOVE IMPACTED EAR WAX UNI: CPT | Performed by: PHYSICIAN ASSISTANT

## 2021-08-15 RX ORDER — OFLOXACIN 3 MG/ML
10 SOLUTION AURICULAR (OTIC) DAILY
Qty: 5 ML | Refills: 0 | Status: SHIPPED | OUTPATIENT
Start: 2021-08-15 | End: 2022-03-31 | Stop reason: ALTCHOICE

## 2021-08-15 NOTE — PATIENT INSTRUCTIONS
Cerumen Impaction   WHAT YOU NEED TO KNOW:   Cerumen impaction is the blockage of the outer ear canal by tightly packed cerumen (earwax)  It is generally treated with procedures such as flushing or suctioning the ear canal or the use of instruments to remove the impaction  DISCHARGE INSTRUCTIONS:   Medicines:  · Ear drops: These are used to soften the wax in your ear  Wax softening ear drops may be bought without a prescription  Ask your healthcare provider how often you should use this medicine  Read the instructions carefully before you use the ear drops  Do the following when you put in ear drops:     ? Warm the drops by holding the bottle in your hands for a few minutes  Cold ear drops may make you dizzy  ? Lie down with the affected ear toward the ceiling  You may also stand with your head tilted to one side  ? Pull your ear lobe up and back, and place the correct number of drops into the ear  ? Keep your ear facing up for 5 to 10 minutes so the drops coat the outer ear canal      ? Gently clean the outer part of the ear with a cotton swab  Do not  place the cotton swab or anything inside your ear canal  This increases the risk of damaging your eardrum  · Take your medicine as directed  Contact your healthcare provider if you think your medicine is not helping or if you have side effects  Tell him of her if you are allergic to any medicine  Keep a list of the medicines, vitamins, and herbs you take  Include the amounts, and when and why you take them  Bring the list or the pill bottles to follow-up visits  Carry your medicine list with you in case of an emergency  Follow up with your healthcare provider as directed:  Write down your questions so you remember to ask them during your visits  Contact your healthcare provider if:   · You have a fever  · You have trouble hearing or ringing in your ear  · You have questions about your condition or care      Return to the emergency department if:   · You feel dizzy  · You have discharge or blood coming out of your ear  · Your ear pain does not go away or gets worse  © Copyright [employer] 2018 Information is for End User's use only and may not be sold, redistributed or otherwise used for commercial purposes  All illustrations and images included in CareNotes® are the copyrighted property of A D A M [employer] Inc  or [employer]   The above information is an  only  It is not intended as medical advice for individual conditions or treatments  Talk to your doctor, nurse or pharmacist before following any medical regimen to see if it is safe and effective for you  Swimmer's Ear   WHAT YOU NEED TO KNOW:   Swimmer's ear, also called otitis externa, is an infection in the outer ear canal  This canal goes from the outside of your ear to your eardrum  Swimmer's ear most often occurs when water remains in your ear after you swim  This creates a moist area for bacteria to grow  Scratches or damage from your fingers, cotton swabs, or other objects can also cause swimmer's ear  DISCHARGE INSTRUCTIONS:   Return to the emergency department if:   · You have severe ear pain  · You are suddenly not able to hear  · You have new swelling in your face, behind your ears, or in your neck  · You suddenly cannot move part of your face, or it feels numb  Call your doctor if:   · You have a fever  · Your symptoms get worse or do not go away, even after treatment  · You have questions or concerns about your condition or care  Treatment for swimmer's ear  may include cleaning your outer ear canal first  This will help clean any ear wax, flaky skin, or other discharge  You may then need any of the following:  · Medicines:      ? Ear drops  help fight infection and decrease redness and swelling  ? Acetaminophen  decreases pain and fever  It is available without a doctor's order   Ask how much to take and how often to take it  Follow directions  Read the labels of all other medicines you are using to see if they also contain acetaminophen, or ask your doctor or pharmacist  Acetaminophen can cause liver damage if not taken correctly  Do not use more than 4 grams (4,000 milligrams) total of acetaminophen in one day  ? NSAIDs , such as ibuprofen, help decrease swelling, pain, and fever  This medicine is available with or without a doctor's order  NSAIDs can cause stomach bleeding or kidney problems in certain people  If you take blood thinner medicine, always ask your healthcare provider if NSAIDs are safe for you  Always read the medicine label and follow directions  · An ear wick  may be used if your ear canal is blocked  A wick (small tube) made of cotton or gauze is placed in your ear  The wick helps pull extra fluid out of your ear canal  Roxy also help draw medicine into your ear canal     How to use ear drops:   · Warm the bottle of ear drops in your hands  for a few minutes  · Lie down on your side with your infected ear facing up  This will help the medicine travel completely through your ear canal     · Gently pull the ear up and back  Carefully drip the correct number of ear drops into your ear  Have another person help you if possible  · For children younger than 3 years,  gently pull and hold the ear down and back  · For children older than 3 years,  gently pull and hold the ear up and back  · Stay in the same position  for 3 to 5 minutes to let the medicine soak in  Prevent swimmer's ear:   · Dry your ears completely after you swim or bathe  Gently wipe your outer ear with a soft towel or cloth  Use ear plugs when you swim  · Do not put cotton swabs or other objects in your ears  These can scratch or damage your ear  They can also push ear wax deeper in and irritate your ear      · Put cotton balls gently in your outer ear  when you apply hair spray, hair dye, or perfume  Follow up with your doctor as directed:  Write down your questions so you remember to ask them during your visits  © Copyright [employer] 2021 Information is for End User's use only and may not be sold, redistributed or otherwise used for commercial purposes  All illustrations and images included in CareNotes® are the copyrighted property of A [employer]  or [employer]  The above information is an  only  It is not intended as medical advice for individual conditions or treatments  Talk to your doctor, nurse or pharmacist before following any medical regimen to see if it is safe and effective for you

## 2021-08-15 NOTE — PROGRESS NOTES
[facility]        NAME: Charly Lopez is a 37 y o  female  : 1980    MRN: [id number]  DATE: 2021  TIME: 8:12 AM    Assessment and Plan   Hearing loss of left ear due to cerumen impaction [H61 22]  1  Hearing loss of left ear due to cerumen impaction     2  Acute otitis externa of left ear, unspecified type  ofloxacin (FLOXIN) 0 3 % otic solution         Patient Instructions   Patient Instructions     Cerumen Impaction   WHAT YOU NEED TO KNOW:   Cerumen impaction is the blockage of the outer ear canal by tightly packed cerumen (earwax)  It is generally treated with procedures such as flushing or suctioning the ear canal or the use of instruments to remove the impaction  DISCHARGE INSTRUCTIONS:   Medicines:  · Ear drops: These are used to soften the wax in your ear  Wax softening ear drops may be bought without a prescription  Ask your healthcare provider how often you should use this medicine  Read the instructions carefully before you use the ear drops  Do the following when you put in ear drops:     ? Warm the drops by holding the bottle in your hands for a few minutes  Cold ear drops may make you dizzy  ? Lie down with the affected ear toward the ceiling  You may also stand with your head tilted to one side  ? Pull your ear lobe up and back, and place the correct number of drops into the ear  ? Keep your ear facing up for 5 to 10 minutes so the drops coat the outer ear canal      ? Gently clean the outer part of the ear with a cotton swab  Do not  place the cotton swab or anything inside your ear canal  This increases the risk of damaging your eardrum  · Take your medicine as directed  Contact your healthcare provider if you think your medicine is not helping or if you have side effects  Tell him of her if you are allergic to any medicine  Keep a list of the medicines, vitamins, and herbs you take  Include the amounts, and when and why you take them   Bring the list or the pill bottles to follow-up visits  Carry your medicine list with you in case of an emergency  Follow up with your healthcare provider as directed:  Write down your questions so you remember to ask them during your visits  Contact your healthcare provider if:   · You have a fever  · You have trouble hearing or ringing in your ear  · You have questions about your condition or care  Return to the emergency department if:   · You feel dizzy  · You have discharge or blood coming out of your ear  · Your ear pain does not go away or gets worse  © Copyright [employer] 2018 Information is for End User's use only and may not be sold, redistributed or otherwise used for commercial purposes  All illustrations and images included in CareNotes® are the copyrighted property of A D A M , Inc  or [employer]   The above information is an  only  It is not intended as medical advice for individual conditions or treatments  Talk to your doctor, nurse or pharmacist before following any medical regimen to see if it is safe and effective for you  Swimmer's Ear   WHAT YOU NEED TO KNOW:   Swimmer's ear, also called otitis externa, is an infection in the outer ear canal  This canal goes from the outside of your ear to your eardrum  Swimmer's ear most often occurs when water remains in your ear after you swim  This creates a moist area for bacteria to grow  Scratches or damage from your fingers, cotton swabs, or other objects can also cause swimmer's ear  DISCHARGE INSTRUCTIONS:   Return to the emergency department if:   · You have severe ear pain  · You are suddenly not able to hear  · You have new swelling in your face, behind your ears, or in your neck  · You suddenly cannot move part of your face, or it feels numb  Call your doctor if:   · You have a fever  · Your symptoms get worse or do not go away, even after treatment      · You have questions or concerns about your condition or care  Treatment for swimmer's ear  may include cleaning your outer ear canal first  This will help clean any ear wax, flaky skin, or other discharge  You may then need any of the following:  · Medicines:      ? Ear drops  help fight infection and decrease redness and swelling  ? Acetaminophen  decreases pain and fever  It is available without a doctor's order  Ask how much to take and how often to take it  Follow directions  Read the labels of all other medicines you are using to see if they also contain acetaminophen, or ask your doctor or pharmacist  Acetaminophen can cause liver damage if not taken correctly  Do not use more than 4 grams (4,000 milligrams) total of acetaminophen in one day  ? NSAIDs , such as ibuprofen, help decrease swelling, pain, and fever  This medicine is available with or without a doctor's order  NSAIDs can cause stomach bleeding or kidney problems in certain people  If you take blood thinner medicine, always ask your healthcare provider if NSAIDs are safe for you  Always read the medicine label and follow directions  · An ear wick  may be used if your ear canal is blocked  A wick (small tube) made of cotton or gauze is placed in your ear  The wick helps pull extra fluid out of your ear canal  Roxy also help draw medicine into your ear canal     How to use ear drops:   · Warm the bottle of ear drops in your hands  for a few minutes  · Lie down on your side with your infected ear facing up  This will help the medicine travel completely through your ear canal     · Gently pull the ear up and back  Carefully drip the correct number of ear drops into your ear  Have another person help you if possible  · For children younger than 3 years,  gently pull and hold the ear down and back  · For children older than 3 years,  gently pull and hold the ear up and back           · Stay in the same position  for 3 to 5 minutes to let the medicine soak in  Prevent swimmer's ear:   · Dry your ears completely after you swim or bathe  Gently wipe your outer ear with a soft towel or cloth  Use ear plugs when you swim  · Do not put cotton swabs or other objects in your ears  These can scratch or damage your ear  They can also push ear wax deeper in and irritate your ear  · Put cotton balls gently in your outer ear  when you apply hair spray, hair dye, or perfume  Follow up with your doctor as directed:  Write down your questions so you remember to ask them during your visits  © Copyright [employer] 2021 Information is for End User's use only and may not be sold, redistributed or otherwise used for commercial purposes  All illustrations and images included in CareNotes® are the copyrighted property of A D A M , Inc  or [facility] [employer] [employer]   The above information is an  only  It is not intended as medical advice for individual conditions or treatments  Talk to your doctor, nurse or pharmacist before following any medical regimen to see if it is safe and effective for you  Follow up with PCP in 3-5 days  Proceed to  ER if symptoms worsen  Chief Complaint     Chief Complaint   Patient presents with    Earache     L ear x a few months  pain increased a few days ago  was able to get a lot of wax out with OTC ear drops  History of Present Illness         Patient is a 81-year-old female presenting with left ear pain congestion the past few days  History of ear problems for the past few months but got worse recently  Denies any drainage from the ear, recent congestion, fevers  Review of Systems   Review of Systems   Constitutional: Negative for fever  HENT: Positive for ear pain  Negative for congestion, ear discharge, mouth sores, postnasal drip, rhinorrhea, sinus pressure, sinus pain, sore throat and trouble swallowing  Psychiatric/Behavioral: Negative for confusion           Current Medications Current Outpatient Medications:     ofloxacin (FLOXIN) 0 3 % otic solution, Administer 10 drops into the left ear daily, Disp: 5 mL, Rfl: 0    Current Allergies     Allergies as of 08/15/2021    (No Known Allergies)            The following portions of the patient's history were reviewed and updated as appropriate: allergies, current medications, past family history, past medical history, past social history, past surgical history and problem list      History reviewed  No pertinent past medical history  History reviewed  No pertinent surgical history  Family History   Problem Relation Age of Onset    No Known Problems Mother     No Known Problems Father          Medications have been verified  Objective   Pulse 60   Temp 97 6 °F (36 4 °C) (Temporal)   Resp 18   Ht 5' 5" (1 651 m)   Wt 49 9 kg (110 lb)   SpO2 98%   BMI 18 30 kg/m²        Physical Exam     Physical Exam  Constitutional:       Appearance: Normal appearance  HENT:      Head: Normocephalic and atraumatic  Right Ear: Tympanic membrane, ear canal and external ear normal       Left Ear: Tympanic membrane and external ear normal  There is impacted cerumen  Ears:      Comments: Left cerumen impaction noted  Mild erythema and squamous debris the left ear canal noted following lavage  TM was visualized to be intact and within normal limits  Nose: Nose normal       Mouth/Throat:      Mouth: Mucous membranes are moist       Pharynx: Oropharynx is clear  Musculoskeletal:      Cervical back: No muscular tenderness  Neurological:      Mental Status: She is alert  Psychiatric:         Mood and Affect: Mood normal          Behavior: Behavior normal          Ear cerumen removal    Date/Time: 8/20/2021 8:11 AM  Performed by: Delmis Hobbs PA-C  Authorized by: Delmis Hobbs PA-C   Universal Protocol:  Consent: Verbal consent obtained    Risks and benefits: risks, benefits and alternatives were discussed  Consent given by: patient  Patient understanding: patient states understanding of the procedure being performed  Patient consent: the patient's understanding of the procedure matches consent given      Patient location:  Clinic  Procedure details:     Location:  L ear    Procedure type: irrigation only      Approach:  External  Post-procedure details:     Complication:  None    Hearing quality:  Improved    Patient tolerance of procedure:   Tolerated well, no immediate complications

## 2021-08-25 NOTE — PROGRESS NOTES
Update 8/25/21:   Patient has not returned to PT despite phone calls made for patient to return  No data available for DC summary  DC episode of care at this time

## 2022-03-31 ENCOUNTER — OFFICE VISIT (OUTPATIENT)
Dept: GASTROENTEROLOGY | Facility: CLINIC | Age: 42
End: 2022-03-31
Payer: COMMERCIAL

## 2022-03-31 VITALS
DIASTOLIC BLOOD PRESSURE: 62 MMHG | RESPIRATION RATE: 18 BRPM | SYSTOLIC BLOOD PRESSURE: 100 MMHG | WEIGHT: 114.2 LBS | BODY MASS INDEX: 19.03 KG/M2 | HEIGHT: 65 IN

## 2022-03-31 DIAGNOSIS — R14.0 ABDOMINAL BLOATING: Primary | ICD-10-CM

## 2022-03-31 DIAGNOSIS — R10.9 ABDOMINAL PAIN, UNSPECIFIED ABDOMINAL LOCATION: ICD-10-CM

## 2022-03-31 PROCEDURE — 99204 OFFICE O/P NEW MOD 45 MIN: CPT | Performed by: PHYSICIAN ASSISTANT

## 2022-03-31 NOTE — PATIENT INSTRUCTIONS
Scheduled date of EGD/colonoscopy (as of today): 4/13  Physician performing EGD/colonoscopy:enrique  Location of EGD/colonoscopy: [facility]  Desired bowel prep reviewed with patient: miralax  Instructions reviewed with patient by:edwin  Clearances:

## 2022-03-31 NOTE — PROGRESS NOTES
[facility] - Outpatient Consultation  Neal Chaudhry 37 y o  female MRN: [id number]  Encounter: [id number]          ASSESSMENT AND PLAN:      1  Abdominal bloating  2  Abdominal pain    Patient presents with complaints of significant bloating and LLQ discomfort for a number of months  She has tried a low FODMAP diet, removed dairy from her diet, and is on probiotics/prebiotics without benefit  She reports she had food sensitivity testing for which she is awaiting results  Will plan for EGD and colonoscopy with visualization of the terminal ileum to investigate - evaluate for PUD, gastritis, bx for h pylori and celiac, crohn's/IBD, etc   Will check abdominal and pelvic ultrasound  Will check labs, celiac serology, TSH, CRP  Will check hydrogen breath test for SIBO   ______________________________________________________________________    HPI:  Patient is a pleasant 37year old female who presents to the office for an evaluation of chronic abdominal bloating  She reports for a number of months, almost a year she has been struggling with significant bloating  She also reports of left lower abdominal discomfort  No nausea or vomiting  No diarrhea or constipation  Her bowel movements are about twice a day and formed  No rectal bleeding  No heartburn  She reports a great grandparent with colon cancer  She also reports an aunt with colitis  She has tried a low FODMAP diet and removed dairy from her diet  She also is on probiotics/prebiotics without benefit  She does not drink alcohol  She reports she had food sensitivity testing and is currently awaiting the results  She did have a CT A/P in July of 2021 which showed trace pelvic ascites, likely physiologic  REVIEW OF SYSTEMS:    CONSTITUTIONAL: Denies any fever, chills, rigors, and weight loss  HEENT: No earache or tinnitus  Denies hearing loss or visual disturbances  CARDIOVASCULAR: No chest pain or palpitations  RESPIRATORY: Denies any cough, hemoptysis, shortness of breath or dyspnea on exertion  GASTROINTESTINAL: As noted in the History of Present Illness  GENITOURINARY: No problems with urination  Denies any hematuria or dysuria  NEUROLOGIC: No dizziness or vertigo, denies headaches  MUSCULOSKELETAL: Denies any muscle or joint pain  SKIN: Denies skin rashes or itching  ENDOCRINE: Denies excessive thirst  Denies intolerance to heat or cold  PSYCHOSOCIAL: Denies depression or anxiety  Denies any recent memory loss  Historical Information   No past medical history on file  No past surgical history on file  Social History   Social History     Substance and Sexual Activity   Alcohol Use Never     Social History     Substance and Sexual Activity   Drug Use Never     Social History     Tobacco Use   Smoking Status Never Smoker   Smokeless Tobacco Never Used     Family History   Problem Relation Age of Onset    No Known Problems Mother     No Known Problems Father        Meds/Allergies     No current outpatient medications on file  No Known Allergies        Objective     Blood pressure 100/62, resp  rate 18, height 5' 5" (1 651 m), weight 51 8 kg (114 lb 3 2 oz)  Body mass index is 19 kg/m²  PHYSICAL EXAM:      General Appearance:   Alert, cooperative, no distress   HEENT:   Normocephalic, atraumatic, anicteric      Neck:  Supple, symmetrical, trachea midline   Lungs:   Clear to auscultation bilaterally; no rales, rhonchi or wheezing; respirations unlabored    Heart[de-identified]   Regular rate and rhythm; no murmur, rub, or gallop     Abdomen:   Soft, non-tender, non-distended; normal bowel sounds; no masses, no organomegaly    Genitalia:   Deferred    Rectal:   Deferred    Extremities:  No cyanosis, clubbing or edema    Pulses:  2+ and symmetric    Skin:  No jaundice, rashes, or lesions    Lymph nodes:  No palpable cervical lymphadenopathy        Lab Results:   No visits with results within 1 Day(s) from this visit  Latest known visit with results is:   Admission on 07/11/2021, Discharged on 07/11/2021   Component Date Value    WBC 07/11/2021 3 48*    RBC 07/11/2021 3 88     Hemoglobin 07/11/2021 12 7     Hematocrit 07/11/2021 36 7     MCV 07/11/2021 95     MCH 07/11/2021 32 7     MCHC 07/11/2021 34 6     RDW 07/11/2021 12 1     MPV 07/11/2021 10 0     Platelets 21/46/6327 261     nRBC 07/11/2021 0     Neutrophils Relative 07/11/2021 55     Immat GRANS % 07/11/2021 0     Lymphocytes Relative 07/11/2021 30     Monocytes Relative 07/11/2021 11     Eosinophils Relative 07/11/2021 3     Basophils Relative 07/11/2021 1     Neutrophils Absolute 07/11/2021 1 89     Immature Grans Absolute 07/11/2021 0 01     Lymphocytes Absolute 07/11/2021 1 04     Monocytes Absolute 07/11/2021 0 37     Eosinophils Absolute 07/11/2021 0 12     Basophils Absolute 07/11/2021 0 05     Sodium 07/11/2021 136     Potassium 07/11/2021 3 8     Chloride 07/11/2021 101     CO2 07/11/2021 27     ANION GAP 07/11/2021 8     BUN 07/11/2021 7     Creatinine 07/11/2021 0 69     Glucose 07/11/2021 81     Calcium 07/11/2021 8 6     AST 07/11/2021 13     ALT 07/11/2021 18     Alkaline Phosphatase 07/11/2021 47     Total Protein 07/11/2021 6 2*    Albumin 07/11/2021 3 6     Total Bilirubin 07/11/2021 0 50     eGFR 07/11/2021 109     Color, UA 07/11/2021 Light Yellow     Clarity, UA 07/11/2021 Clear     Specific Gravity, UA 07/11/2021 <=1 005     pH, UA 07/11/2021 7 0     Leukocytes, UA 07/11/2021 Negative     Nitrite, UA 07/11/2021 Negative     Protein, UA 07/11/2021 Negative     Glucose, UA 07/11/2021 Negative     Ketones, UA 07/11/2021 Trace*    Urobilinogen, UA 07/11/2021 0 2     Bilirubin, UA 07/11/2021 Negative     Blood, UA 07/11/2021 Negative     EXT PREG TEST UR (Ref: N* 07/11/2021 negative     Control 07/11/2021 valid     Lipase 07/11/2021 49*         Radiology Results:   No results found

## 2022-04-11 ENCOUNTER — TELEPHONE (OUTPATIENT)
Dept: GASTROENTEROLOGY | Facility: CLINIC | Age: 42
End: 2022-04-11

## 2022-04-14 ENCOUNTER — LAB (OUTPATIENT)
Dept: LAB | Facility: CLINIC | Age: 42
End: 2022-04-14
Payer: COMMERCIAL

## 2022-04-14 DIAGNOSIS — R14.0 ABDOMINAL BLOATING: ICD-10-CM

## 2022-04-14 LAB
ALBUMIN SERPL BCP-MCNC: 4.2 G/DL (ref 3.5–5)
ALP SERPL-CCNC: 46 U/L (ref 46–116)
ALT SERPL W P-5'-P-CCNC: 14 U/L (ref 12–78)
ANION GAP SERPL CALCULATED.3IONS-SCNC: 1 MMOL/L (ref 4–13)
AST SERPL W P-5'-P-CCNC: 8 U/L (ref 5–45)
BASOPHILS # BLD AUTO: 0.06 THOUSANDS/ΜL (ref 0–0.1)
BASOPHILS NFR BLD AUTO: 2 % (ref 0–1)
BILIRUB SERPL-MCNC: 0.48 MG/DL (ref 0.2–1)
BUN SERPL-MCNC: 6 MG/DL (ref 5–25)
CALCIUM SERPL-MCNC: 9 MG/DL (ref 8.3–10.1)
CHLORIDE SERPL-SCNC: 104 MMOL/L (ref 100–108)
CO2 SERPL-SCNC: 29 MMOL/L (ref 21–32)
CREAT SERPL-MCNC: 0.76 MG/DL (ref 0.6–1.3)
CRP SERPL QL: <3 MG/L
EOSINOPHIL # BLD AUTO: 0.18 THOUSAND/ΜL (ref 0–0.61)
EOSINOPHIL NFR BLD AUTO: 5 % (ref 0–6)
ERYTHROCYTE [DISTWIDTH] IN BLOOD BY AUTOMATED COUNT: 13.1 % (ref 11.6–15.1)
GFR SERPL CREATININE-BSD FRML MDRD: 98 ML/MIN/1.73SQ M
GLUCOSE SERPL-MCNC: 47 MG/DL (ref 65–140)
HCT VFR BLD AUTO: 37.3 % (ref 34.8–46.1)
HGB BLD-MCNC: 12.3 G/DL (ref 11.5–15.4)
IMM GRANULOCYTES # BLD AUTO: 0.02 THOUSAND/UL (ref 0–0.2)
IMM GRANULOCYTES NFR BLD AUTO: 1 % (ref 0–2)
LYMPHOCYTES # BLD AUTO: 1.03 THOUSANDS/ΜL (ref 0.6–4.47)
LYMPHOCYTES NFR BLD AUTO: 27 % (ref 14–44)
MCH RBC QN AUTO: 32.3 PG (ref 26.8–34.3)
MCHC RBC AUTO-ENTMCNC: 33 G/DL (ref 31.4–37.4)
MCV RBC AUTO: 98 FL (ref 82–98)
MONOCYTES # BLD AUTO: 0.47 THOUSAND/ΜL (ref 0.17–1.22)
MONOCYTES NFR BLD AUTO: 12 % (ref 4–12)
NEUTROPHILS # BLD AUTO: 2.12 THOUSANDS/ΜL (ref 1.85–7.62)
NEUTS SEG NFR BLD AUTO: 53 % (ref 43–75)
NRBC BLD AUTO-RTO: 0 /100 WBCS
PLATELET # BLD AUTO: 267 THOUSANDS/UL (ref 149–390)
PMV BLD AUTO: 10.9 FL (ref 8.9–12.7)
POTASSIUM SERPL-SCNC: 4.9 MMOL/L (ref 3.5–5.3)
PROT SERPL-MCNC: 6.3 G/DL (ref 6.4–8.2)
RBC # BLD AUTO: 3.81 MILLION/UL (ref 3.81–5.12)
SODIUM SERPL-SCNC: 134 MMOL/L (ref 136–145)
TSH SERPL DL<=0.05 MIU/L-ACNC: 0.6 UIU/ML (ref 0.45–4.5)
WBC # BLD AUTO: 3.88 THOUSAND/UL (ref 4.31–10.16)

## 2022-04-14 PROCEDURE — 86258 DGP ANTIBODY EACH IG CLASS: CPT

## 2022-04-14 PROCEDURE — 84443 ASSAY THYROID STIM HORMONE: CPT

## 2022-04-14 PROCEDURE — 80053 COMPREHEN METABOLIC PANEL: CPT

## 2022-04-14 PROCEDURE — 86140 C-REACTIVE PROTEIN: CPT

## 2022-04-14 PROCEDURE — 87177 OVA AND PARASITES SMEARS: CPT

## 2022-04-14 PROCEDURE — 85025 COMPLETE CBC W/AUTO DIFF WBC: CPT

## 2022-04-14 PROCEDURE — 86231 EMA EACH IG CLASS: CPT

## 2022-04-14 PROCEDURE — 87209 SMEAR COMPLEX STAIN: CPT

## 2022-04-14 PROCEDURE — 36415 COLL VENOUS BLD VENIPUNCTURE: CPT

## 2022-04-14 PROCEDURE — 86364 TISS TRNSGLTMNASE EA IG CLAS: CPT

## 2022-04-14 PROCEDURE — 82784 ASSAY IGA/IGD/IGG/IGM EACH: CPT

## 2022-04-15 LAB
ENDOMYSIUM IGA SER QL: NEGATIVE
GLIADIN PEPTIDE IGA SER-ACNC: 5 UNITS (ref 0–19)
GLIADIN PEPTIDE IGG SER-ACNC: 2 UNITS (ref 0–19)
IGA SERPL-MCNC: 164 MG/DL (ref 87–352)
TTG IGA SER-ACNC: <2 U/ML (ref 0–3)
TTG IGG SER-ACNC: <2 U/ML (ref 0–5)

## 2022-04-18 ENCOUNTER — TELEPHONE (OUTPATIENT)
Dept: GASTROENTEROLOGY | Facility: CLINIC | Age: 42
End: 2022-04-18

## 2022-04-18 NOTE — TELEPHONE ENCOUNTER
----- Message from Simona Courtney PA-C sent at 4/16/2022 10:06 AM EDT -----  Please inform patient that the celiac serology was negative

## 2022-04-20 ENCOUNTER — TELEPHONE (OUTPATIENT)
Dept: GASTROENTEROLOGY | Facility: CLINIC | Age: 42
End: 2022-04-20

## 2022-04-20 NOTE — TELEPHONE ENCOUNTER
----- Message from Sofy Gallego PA-C sent at 4/20/2022  8:12 AM EDT -----  Please inform patient that the ova and parasite testing was negative

## 2022-06-10 ENCOUNTER — EVALUATION (OUTPATIENT)
Dept: PHYSICAL THERAPY | Facility: CLINIC | Age: 42
End: 2022-06-10

## 2022-06-10 VITALS — SYSTOLIC BLOOD PRESSURE: 85 MMHG | DIASTOLIC BLOOD PRESSURE: 58 MMHG | HEART RATE: 52 BPM

## 2022-06-10 DIAGNOSIS — G89.29 CHRONIC LEFT-SIDED LOW BACK PAIN WITHOUT SCIATICA: Primary | ICD-10-CM

## 2022-06-10 DIAGNOSIS — M54.50 CHRONIC LEFT-SIDED LOW BACK PAIN WITHOUT SCIATICA: Primary | ICD-10-CM

## 2022-06-10 PROCEDURE — 97162 PT EVAL MOD COMPLEX 30 MIN: CPT | Performed by: PHYSICAL THERAPIST

## 2022-06-10 NOTE — PROGRESS NOTES
PT Evaluation     Today's date: 6/10/2022  Patient name: Celeste Youssef  : 1980  MRN: [id number]  Referring provider: Twin Zamorano, *  Dx:   Encounter Diagnosis     ICD-10-CM    1  Chronic left-sided low back pain without sciatica  M54 50     G89 29                   Assessment  Assessment details: Celeste Youssef is a 40 y o  female who presents with complaints of chronic left-sided low back pain without sciatica  Patient presented to PT in the past and was making progress, but moved to her other residence in [facility] and therefore never completed her course of PT at that time  Patient has attempted to rest from activity, however continues to experience "flare ups" in her condition  Upon muscle testing, there is noticeable overuse of lumbar paraspinals and poor coordination of firing multifidus mm with attempted UE and LE movement in prone position  Patient will benefit from neuromuscular re-education exercises to focus on appropriate TrAb, multifidus, and gluteal mm activation in order to protect her spine during dynamic activity and to avoid overuse of compensatory muscles of pelvis and spine  Patient was provided with initial HEP  Will f/u with patient 1x/week and progress exercises as able  Discouraged patient from progressing exercises too quickly      Impairments: abnormal muscle firing, activity intolerance, impaired physical strength, pain with function and poor body mechanics  Functional limitations: painful lifting, bending, twisting, limited tolerance to walking; avoiding certain activities such as bike ridingUnderstanding of Dx/Px/POC: good  Goals  STGs  1) In 2-4 weeks patient will report 50% reduction in pain  2) In 2-4 weeks patient will demonstrate improved mm firing in prone    LTGs  1) IN 4-8 weeks patient will demonstrate independence with HEP and understanding of safe progression of exercise  2) In 4-8 weeks patient will demonstrate good core strength, able to maintain full prone plank for 1 min without pelvic drop or increased pain    Plan  Patient would benefit from: skilled physical therapy  Referral necessary: No  Planned modality interventions: cryotherapy, unattended electrical stimulation, traction and thermotherapy: hydrocollator packs  Planned therapy interventions: joint mobilization, massage, manual therapy, Singh taping, abdominal trunk stabilization, balance, balance/weight bearing training, neuromuscular re-education, patient education, postural training, self care, strengthening, stretching, therapeutic activities, therapeutic exercise, flexibility, functional ROM exercises and home exercise program  Frequency: 1x week  Duration in weeks: 4  Plan of Care beginning date: 6/10/2022  Plan of Care expiration date: 2022  Treatment plan discussed with: patient        Subjective Evaluation    History of Present Illness  Mechanism of injury: Patient presents to PT with c/o chronic LBP  She has had PT here before for this same problem about a year ago  The pain has improved since last year, but still comes and goes  She feels best when laying on her back  She feels worse with standing for a while or when sitting  Walking aggravates it  She has not tried riding a bike  She was trying elliptical machine but it was aggravating it  Yoga stretching does not feel any better  She feels worse with twisting or with core exercises  She tried playing disc golf but notices the pain with twisting movement  She has been going for acupuncture treatment 1x/week and this reduces the pain for a few days, but the pain never fully goes away  She saw a PT in [facility] and has also had massage therapy  Over time it has gotten better, but it has never fully taken the pain away  She has not had any imaging since this pain began a year ago     Pain  Current pain ratin  At best pain ratin  At worst pain ratin  Quality: tight    Social Support  Lives with: significant other    Employment status: working    Diagnostic Tests  No diagnostic tests performed  Treatments  Previous treatment: massage  Patient Goals  Patient goals for therapy: decreased pain          Objective     Concurrent Complaints  Negative for night pain, disturbed sleep, bladder dysfunction, bowel dysfunction and saddle (S4) numbness    Palpation   Left   Tenderness of the iliopsoas and quadratus lumborum  Tenderness     Left Hip   No tenderness in the ASIS, PSIS, greater trochanter, iliac crest, ischial tuberosity, pubic tubercle and sacroiliac joint  Active Range of Motion     Lumbar   Flexion:  WFL  Extension:  WFL  Left lateral flexion:  WFL  Right lateral flexion:  WFL  Left rotation:  WFL  Right rotation:  [facility]    Muscle Activation   Patient able to activate left multifidus and right multifidus       Additional Muscle Activation Details  L paraspinals firing early with prone leg lifts  Poor strength with full plank position, pelvic drop             Precautions: none  POC exp 7/8/22  Manuals 6/10                                                                Neuro Re-Ed             Prone LE lifts x10 ea            Prone alt UE/LE lifts             Prone b/l UE/LE lifts ("superman")             Bridges x10            Planks modified through knees and forearms                                      Ther Ex                          Prone press ups                                                                                           Ther Activity                                       Gait Training                                       Modalities

## 2022-07-14 NOTE — PROGRESS NOTES
Discharge 7/14/22:  Patient has not returned to physical therapy since their initial evaluation  Measurements were unable to be updated from patient initial evaluation  As a result, patient is discharged from physical therapy

## 2022-11-10 ENCOUNTER — TELEPHONE (OUTPATIENT)
Dept: OTHER | Facility: OTHER | Age: 42
End: 2022-11-10

## 2022-11-10 NOTE — TELEPHONE ENCOUNTER
Patient is calling to get the names of the exams/labwork and details of the results, for her new doctor, she is in [place] and cannot get this locally information  I did give her medical records number as well try to help her get into her mychart  She is requesting to speak with LEBRON Rubin about these and then have them emailed to her   Her email is listed in her registration

## 2022-11-11 NOTE — TELEPHONE ENCOUNTER
I reviewed these results with her in April but please call her to review these results with her again  She had a CBC, CMP, TSH, CRP, and celiac serology in April  Testing for celiac disease was negative and CRP and TSH levels were normal   Her WBC count, sodium level, and glucose levels were low on the labs and she was instructed to follow up with her PCP  She also had 3 stool tests for ova and parasites which came back negative  I also recommended an ultrasound and EGD/colonoscopy at her office visit but she did not have those tests completed  I am not able to email her but does she have [facility]? She just needs to log in to her [facility] account can easily view the results on [facility] to show her new physician in [facility] for further management  Thanks!